# Patient Record
Sex: MALE | Race: WHITE | Employment: FULL TIME | URBAN - METROPOLITAN AREA
[De-identification: names, ages, dates, MRNs, and addresses within clinical notes are randomized per-mention and may not be internally consistent; named-entity substitution may affect disease eponyms.]

---

## 2023-09-11 ENCOUNTER — PATIENT OUTREACH (OUTPATIENT)
Dept: CARE COORDINATION | Facility: CLINIC | Age: 48
End: 2023-09-11
Payer: COMMERCIAL

## 2023-09-11 NOTE — PROGRESS NOTES
Outreach call to patient to support a smooth transition of care from recent admission.  Unable to leave a voicemail message for patient with my contact information.    JOHNNA ReneeN, RN.

## 2023-10-29 ENCOUNTER — HOSPITAL ENCOUNTER (EMERGENCY)
Facility: HOSPITAL | Age: 48
Discharge: HOME | End: 2023-10-29
Payer: COMMERCIAL

## 2023-10-29 VITALS
HEIGHT: 67 IN | WEIGHT: 125 LBS | RESPIRATION RATE: 18 BRPM | DIASTOLIC BLOOD PRESSURE: 92 MMHG | TEMPERATURE: 97.7 F | HEART RATE: 45 BPM | OXYGEN SATURATION: 100 % | SYSTOLIC BLOOD PRESSURE: 151 MMHG | BODY MASS INDEX: 19.62 KG/M2

## 2023-10-29 DIAGNOSIS — K08.89 PAIN, DENTAL: Primary | ICD-10-CM

## 2023-10-29 PROCEDURE — 99283 EMERGENCY DEPT VISIT LOW MDM: CPT

## 2023-10-29 RX ORDER — NAPROXEN 500 MG/1
500 TABLET ORAL
Qty: 30 TABLET | Refills: 0 | Status: SHIPPED | OUTPATIENT
Start: 2023-10-29 | End: 2023-11-13

## 2023-10-29 RX ORDER — CHLORHEXIDINE GLUCONATE ORAL RINSE 1.2 MG/ML
15 SOLUTION DENTAL AS NEEDED
Qty: 120 ML | Refills: 0 | Status: SHIPPED | OUTPATIENT
Start: 2023-10-29 | End: 2023-11-12

## 2023-10-29 RX ORDER — AMOXICILLIN AND CLAVULANATE POTASSIUM 875; 125 MG/1; MG/1
1 TABLET, FILM COATED ORAL EVERY 12 HOURS
Qty: 14 TABLET | Refills: 0 | Status: SHIPPED | OUTPATIENT
Start: 2023-10-29 | End: 2023-11-05

## 2023-10-29 ASSESSMENT — PAIN - FUNCTIONAL ASSESSMENT: PAIN_FUNCTIONAL_ASSESSMENT: 0-10

## 2023-10-29 ASSESSMENT — PAIN DESCRIPTION - ONSET: ONSET: SUDDEN

## 2023-10-29 ASSESSMENT — PAIN DESCRIPTION - LOCATION: LOCATION: TEETH

## 2023-10-29 ASSESSMENT — PAIN DESCRIPTION - PROGRESSION: CLINICAL_PROGRESSION: GRADUALLY WORSENING

## 2023-10-29 ASSESSMENT — COLUMBIA-SUICIDE SEVERITY RATING SCALE - C-SSRS
2. HAVE YOU ACTUALLY HAD ANY THOUGHTS OF KILLING YOURSELF?: NO
6. HAVE YOU EVER DONE ANYTHING, STARTED TO DO ANYTHING, OR PREPARED TO DO ANYTHING TO END YOUR LIFE?: NO
1. IN THE PAST MONTH, HAVE YOU WISHED YOU WERE DEAD OR WISHED YOU COULD GO TO SLEEP AND NOT WAKE UP?: NO

## 2023-10-29 ASSESSMENT — PAIN DESCRIPTION - PAIN TYPE: TYPE: ACUTE PAIN

## 2023-10-29 ASSESSMENT — PAIN SCALES - GENERAL: PAINLEVEL_OUTOF10: 10 - WORST POSSIBLE PAIN

## 2023-10-29 ASSESSMENT — PAIN DESCRIPTION - FREQUENCY: FREQUENCY: CONSTANT/CONTINUOUS

## 2023-10-29 ASSESSMENT — PAIN DESCRIPTION - DESCRIPTORS: DESCRIPTORS: SHOOTING;ACHING

## 2023-10-29 ASSESSMENT — PAIN DESCRIPTION - ORIENTATION: ORIENTATION: LEFT;LOWER

## 2023-10-29 NOTE — ED PROVIDER NOTES
HPI   Chief Complaint   Patient presents with    Dental Pain     Pt BIB self for 10/10 dental pain, Pt has a cracked left rear molar that's been painful for several days. Pt complains of decreased appetite and pain to site and jaw.       Patient presents today complaining of dental pain.  Patient reports pain to his lower left jaw.  He reports that he did contact his dentist however he could not get into the office.  Denies fevers.  No reports of drooling or change in phonation.  He denies any facial swelling or asymmetry                          No data recorded                Patient History   No past medical history on file.  Past Surgical History:   Procedure Laterality Date    OTHER SURGICAL HISTORY  04/10/2019    Ear surgery     No family history on file.  Social History     Tobacco Use    Smoking status: Not on file    Smokeless tobacco: Not on file   Substance Use Topics    Alcohol use: Not on file    Drug use: Not on file       Physical Exam   ED Triage Vitals [10/29/23 0113]   Temp Heart Rate Resp BP   36.5 °C (97.7 °F) (!) 45 18 (!) 151/92      SpO2 Temp Source Heart Rate Source Patient Position   100 % Temporal Monitor Sitting      BP Location FiO2 (%)     Right arm --       Physical Exam  Vitals and nursing note reviewed.   Constitutional:       General: He is not in acute distress.     Appearance: Normal appearance. He is normal weight. He is not ill-appearing, toxic-appearing or diaphoretic.   HENT:      Head: Normocephalic.      Nose: Nose normal.      Mouth/Throat:      Mouth: Mucous membranes are moist.      Comments: Poor dentition throughout.  No evidence of Sheyla's angina.  No periapical abscess.  Significant decay throughout the left premolars  Cardiovascular:      Rate and Rhythm: Normal rate and regular rhythm.      Pulses: Normal pulses.   Pulmonary:      Effort: Pulmonary effort is normal. No respiratory distress.      Breath sounds: Normal breath sounds.   Abdominal:      General:  Abdomen is flat.      Palpations: Abdomen is soft.   Musculoskeletal:         General: Normal range of motion.      Cervical back: Normal range of motion and neck supple.   Skin:     General: Skin is warm and dry.      Capillary Refill: Capillary refill takes less than 2 seconds.   Neurological:      General: No focal deficit present.      Mental Status: He is alert and oriented to person, place, and time.   Psychiatric:         Mood and Affect: Mood normal.         Behavior: Behavior normal.         Thought Content: Thought content normal.         Judgment: Judgment normal.         ED Course & MDM   Diagnoses as of 10/29/23 0127   Pain, dental       Medical Decision Making  Patient treated with naproxen Peridex mouthwash and antibiotics.  Patient was instructed to follow-up with his dentist this Monday        Procedure  Procedures     Eze Menendez PA-C  10/29/23 0128

## 2024-02-21 ENCOUNTER — HOSPITAL ENCOUNTER (OUTPATIENT)
Dept: RADIOLOGY | Facility: HOSPITAL | Age: 49
Discharge: HOME | End: 2024-02-21
Payer: COMMERCIAL

## 2024-02-21 DIAGNOSIS — S39.012A STRAIN OF MUSCLE, FASCIA AND TENDON OF LOWER BACK, INITIAL ENCOUNTER: ICD-10-CM

## 2024-02-21 PROCEDURE — 72100 X-RAY EXAM L-S SPINE 2/3 VWS: CPT

## 2024-02-21 PROCEDURE — 72100 X-RAY EXAM L-S SPINE 2/3 VWS: CPT | Performed by: RADIOLOGY

## 2024-03-04 ENCOUNTER — EVALUATION (OUTPATIENT)
Dept: PHYSICAL THERAPY | Facility: CLINIC | Age: 49
End: 2024-03-04
Payer: COMMERCIAL

## 2024-03-04 DIAGNOSIS — S39.012A STRAIN OF MUSCLE, FASCIA AND TENDON OF LOWER BACK, INITIAL ENCOUNTER: ICD-10-CM

## 2024-03-04 PROCEDURE — 97110 THERAPEUTIC EXERCISES: CPT | Mod: GP

## 2024-03-04 PROCEDURE — 97161 PT EVAL LOW COMPLEX 20 MIN: CPT | Mod: GP

## 2024-03-04 ASSESSMENT — PATIENT HEALTH QUESTIONNAIRE - PHQ9
2. FEELING DOWN, DEPRESSED OR HOPELESS: NOT AT ALL
SUM OF ALL RESPONSES TO PHQ9 QUESTIONS 1 AND 2: 0
1. LITTLE INTEREST OR PLEASURE IN DOING THINGS: NOT AT ALL

## 2024-03-04 ASSESSMENT — COLUMBIA-SUICIDE SEVERITY RATING SCALE - C-SSRS
6. HAVE YOU EVER DONE ANYTHING, STARTED TO DO ANYTHING, OR PREPARED TO DO ANYTHING TO END YOUR LIFE?: NO
2. HAVE YOU ACTUALLY HAD ANY THOUGHTS OF KILLING YOURSELF?: NO
1. IN THE PAST MONTH, HAVE YOU WISHED YOU WERE DEAD OR WISHED YOU COULD GO TO SLEEP AND NOT WAKE UP?: NO

## 2024-03-04 NOTE — PROGRESS NOTES
Physical Therapy Evaluation    Patient Name Carlo Cho  MRN: [id number]  Today's Date: 03/04/24  Time Calculation  Start Time: 0900  Stop Time: 0950  Time Calculation (min): 50 min    Insurance: Payor: [employer] / Plan: [employer] / Product Type: *No Product type* /   -authorization required: yes  -number of visits authorized: 12  -Authorization/certification dates: 2/21/24-4/11/25  Next MD appointment: 3/11/24    Therapy Diagnoses:   1. Strain of muscle, fascia and tendon of lower back, initial encounter  PT eval and treat    Follow Up In Physical Therapy          Onset Date: DOI 2/20/24  Referring Provider: Ihsan MOODY-CNP  PT Orders: eval and treat 2x wk 6 wks    Assessment:    Impression/Clinical Presentation: lumbar strain  with flexion bias presenting with dec ROM/core strength/posture limiting function with ADL's and work    Skilled PT services will aid in advancing functional status and attaining therapy goals.    Problem List:  -activity limitations  -Functional limitations  -Pain B LS  -decreased  ROM  -decreased strength   -decreased flexibility  -posture awareness  -decreased knowledge of HEP      Goals:  STG 2 wks  Compliant with HEP  Dec pain 25%    LTG by discharge  I HEP  Improve functional outcome score to indicate improved functional mobility  Dec pain % on pain scale with improved sleep  Inc AROM lumbar WNL with improved LE dsg  Inc core strength 5/5 with ability to RTW full duty  Inc LE flexibility WNL  WNL gait I with proper body mechanics    Rehab Potential:  good    Clinical Presentation:    stable/and/or uncomplicated characteristics                                              Level of Complexity: low    Plan:    Therapeutic exercise, Manual therapy, Home program instruction and progression, Neuromuscular re-education, Self care and home management, Instruction in activity modification, Electrical  stimulation, and Cryotherapy    Nustep/TM for soft tissue warmup, posture instruction/exercises, lumbar ROM, LE flexibility, DLS, modalities prn, job simulation, body mechanics training    2 x week  until goals met or maximum rehab potential met  Pt is currently scheduled for 6 weeks    Plan of care was designed with input and agreement by the patient    Subjective:    Current Episode of Functional Impairment and/or Pain :  Chief complaint/HPI: bent over to  a tire at work with onset of B LS pain L>R  On meds for  a week with relief-steroid/meloxicam    Pain  Pain assessment 0-10  Pain score: 8  Pain location: B LS  Type: achy/sharp    Exacerbating Factors: transition of movement, standing  Relieving Factors: sitting, HP/CP    Current Medical management:     PMHx: Reviewed medical history form with patient and medical screening assessed.   Non-Hodgkins lymphoma 2019-chemotherapy-in remission now     Medications for pain: OTC     Diagnostic Tests: xrays - negative    Precautions: no fall risk/ deaf in R ear    Functional Limitations: Lifting, Dressing, Sleeping, Walking, Working, and Standing    Home Living Situation: lives with family  1 story home    Prior Level of Function worked full duty     Patient Stated Goal For Therapy rid of back pain    Occupation:   Currently on light duty with 10# lifting restriction  Full duty requires 50# or >    Objective:    Ortho:  Lumbar ROM  FB: 40%  BB: 15%  RSB: 25%  LSB: 40%    Flexibility   Hamstrings B 60  piriformis: B min tight     Strength  BLE 5/5  abdominals: 4-/5 pain   back extensors bridges 100% with mild instability    Special Tests:   SLR: B -    posture: RS/FH, dec lumbar lordosis, mild mid thoracic scoliosis    palpation: no POP    Gait: antalgic  No device  Slightly flexed posture  Reciprocal stairs    Repeated Force Analysis    standing flexion: inc pain  standing extension: inc pain  supine flexion: no change  repeated flexion: DKC  dec  pain  hooklying: dec pain  prone: no change  EMILE: inc pain  PPU: NT  repeated PPU: NT      Outcome Measures:  Other Measures  Oswestry Disablity Index (TYSON): 40     Treatment:    PT Evaluation Time Entry  PT Evaluation (Low) Time Entry: 25  minutes    Therapeutic Exercise:                             15 minutes            DKC 10x  SKC 10x  Supine hamstring stretch 10 sec 5x  LTR 10x   Pelvic tilt 10x  Isometric hip adduction 10x                                  Modalities:                                             Electrical Stimulation            minutes    Cold Pack     Lumbar hooklying                     10 minutes    Response to treatment: improved knowledge and understanding of condition  Pain level at 3/10 after exercise    Education: Educated on relevant anatomy and expected plan of care  Instructed in initial HEP including reasoning of given exercises and issued written instructions

## 2024-03-08 ENCOUNTER — TREATMENT (OUTPATIENT)
Dept: PHYSICAL THERAPY | Facility: CLINIC | Age: 49
End: 2024-03-08
Payer: COMMERCIAL

## 2024-03-08 DIAGNOSIS — S39.012A STRAIN OF MUSCLE, FASCIA AND TENDON OF LOWER BACK, INITIAL ENCOUNTER: Primary | ICD-10-CM

## 2024-03-08 PROCEDURE — 97014 ELECTRIC STIMULATION THERAPY: CPT | Mod: GP

## 2024-03-08 PROCEDURE — 97112 NEUROMUSCULAR REEDUCATION: CPT | Mod: GP

## 2024-03-08 PROCEDURE — 97110 THERAPEUTIC EXERCISES: CPT | Mod: GP

## 2024-03-08 NOTE — PROGRESS NOTES
Physical Therapy Treatment Note      Patient Name Carlo Cho  MRN: [id number]  Today's Date: 03/08/24  Time Calculation  Start Time: 0740  Stop Time: 0835  Time Calculation (min): 55 min    Insurance: Payor: [employer] / Plan: [employer] / Product Type: *No Product type* /   Visit #: 2  Date of Onset:  DOI 2/20/24   -authorization required: yes  -number of visits authorized 12  -authorization/ certification dates:2/21/24-4/11/25     General:  [facility] appt: 3/11/24 Ihsan Perdomo APRN-CNP     Therapy Diagnoses:   1. Strain of muscle, fascia and tendon of lower back, initial encounter  Follow Up In Physical Therapy          Assessment:  skilled intervention: exercise progression for lumbar stabilization, modalities for pain relief    Patient would continue to benefit from skilled PT to address remaining functional, objective and subjective deficits to allow them to return to full independence with ADLs     Initiated IFC for pain relief  Pulling into thoracic spine with swiss ball stretches limiting distance with stretch but able to perform    Plan:  Check response to IFC  Piriformis stretch    Precautions: no fall risk    Subjective:  General:  Arrived with slight dec pain but antalgic gait  Called [facility] for pain meds due to high pain levels later in day at work  Pain extending up into thoracic spine    Functional Progress:  Working light duty    Pain  Pain assessment 0-10  Pain score: 5  Pain location: BLS and some lower thoracic pain B today    Compliant with HEP:  yes    Understanding of HEP: WNL    Objective:  Therapeutic Exercise  30 minutes  see below  **indicates new exercises or progression  NP=not performed    Neuromuscular Re-education:  10 minutes  See below  **indicates new exercises or progression  NP=not performed    Modalities   Cold Pack                        15 minutes    Electric Stimulation  IFC/CP B LS  hooklying int 13   15 minutes    Other     Exercise Log:  Nustep L3 6' **  DKC 10x  SKC 10x  Supine hamstring stretch 10 sec 5x strap  LTR 10x   Pelvic tilt 10x  DLS SLR 10x **  Isometric hip adduction 10x  Hooklying hip abduction green 10x 2**  Swiss ball stretches F 10 sec 10x **    Education:  Instructed in progression of exercises and issued written instructions/green tband

## 2024-03-12 ENCOUNTER — TREATMENT (OUTPATIENT)
Dept: PHYSICAL THERAPY | Facility: CLINIC | Age: 49
End: 2024-03-12
Payer: COMMERCIAL

## 2024-03-12 DIAGNOSIS — S39.012A STRAIN OF MUSCLE, FASCIA AND TENDON OF LOWER BACK, INITIAL ENCOUNTER: ICD-10-CM

## 2024-03-12 PROCEDURE — 97112 NEUROMUSCULAR REEDUCATION: CPT | Mod: GP,CQ

## 2024-03-12 PROCEDURE — 97110 THERAPEUTIC EXERCISES: CPT | Mod: GP,CQ

## 2024-03-12 NOTE — PROGRESS NOTES
Physical Therapy Treatment Note      Patient Name Carlo Cho  MRN: [id number]  Today's Date: 03/12/24  Time Calculation  Start Time: 0745  Stop Time: 0840  Time Calculation (min): 55 min    Insurance: Payor: [employer] / Plan: [employer] / Product Type: *No Product type* /   Visit #: 3  Date of Onset:  DOI 2/20/24   -authorization required: yes  -number of visits authorized 12  -authorization/ certification dates:2/21/24-4/11/25     General:  Next MD appt: 3/20/24 Ihsan Perdomo APRN-CNP     Therapy Diagnoses:   1. Strain of muscle, fascia and tendon of lower back, initial encounter  Follow Up In Physical Therapy          Assessment:  skilled intervention: exercise progression for lumbar stabilization, modalities for pain relief    Patient would continue to benefit from skilled PT to address remaining functional, objective and subjective deficits to allow them to return to full independence with ADLs     Progressed with piriformis stretch, updated HEP  Continue IFC for pain relief      Plan:    Attempt standing stabilization    Precautions: no fall risk    Subjective:  General:  Arrived with slight dec pain but antalgic gait  Pain still in LB, sometimes it travels up. IFC helped a lot last session , felt better for most of that day.  Had follow up with [facility] yesterday: no change in meds, stay active , continue PT, use thermacare heat wraps, if no improvement by next appt may refer him to back specialist    Functional Progress:  Working light duty    Pain  Pain assessment 0-10  Pain score: 4  Pain location: BLS and some lower thoracic pain B today    Compliant with HEP:  yes    Understanding of HEP: WNL    Objective:  Therapeutic Exercise  30 minutes  see below  **indicates new exercises or progression  NP=not performed    Neuromuscular Re-education:  10 minutes  See below  **indicates new exercises or progression  NP=not  performed    Modalities   Cold Pack                         10 minutes    Electric Stimulation  IFC/CP B LS hooklying int 13   15 minutes    Other     Exercise Log:  Nustep L3 6'   DKC 10x  SKC 10x  Supine hamstring stretch 10 sec 5x strap  Piriformis stretch 10 sec 5x **  LTR 10x   Pelvic tilt 10x  DLS SLR 10x   Isometric hip adduction 10x  Hooklying hip abduction green 10x 2  Swiss ball stretches F 10 sec 10x     Education:  Instructed in progression of exercises  Access Code: [email or web address]  URL: [email or web address]  Date: 03/12/2024  Prepared by: Lacey    Exercises  - Supine Piriformis Stretch with Leg Straight  - 1 x daily - 7 x weekly - 1 sets - 5 reps - 10 hold

## 2024-03-14 ENCOUNTER — TREATMENT (OUTPATIENT)
Dept: PHYSICAL THERAPY | Facility: CLINIC | Age: 49
End: 2024-03-14
Payer: COMMERCIAL

## 2024-03-14 DIAGNOSIS — S39.012A STRAIN OF MUSCLE, FASCIA AND TENDON OF LOWER BACK, INITIAL ENCOUNTER: ICD-10-CM

## 2024-03-14 PROCEDURE — 97112 NEUROMUSCULAR REEDUCATION: CPT | Mod: GP

## 2024-03-14 PROCEDURE — 97110 THERAPEUTIC EXERCISES: CPT | Mod: GP

## 2024-03-14 PROCEDURE — 97014 ELECTRIC STIMULATION THERAPY: CPT | Mod: GP

## 2024-03-14 NOTE — PROGRESS NOTES
Physical Therapy Treatment Note      Patient Name Carlo Cho  MRN: [id number]  Today's Date: 03/14/24  Time Calculation  Start Time: 0830  Stop Time: 0925  Time Calculation (min): 55 min    Insurance: Payor: [employer] / Plan: [employer] / Product Type: *No Product type* /   Visit #: 4  Date of Onset: DOI 2/20/24  -authorization required: yes  -number of visits authorized 12  -authorization/ certification dates:2/21/24-4/11/25      General:  [facility] appt: 3/20/24 Ihsan Perdomo APRN-CNP     Therapy Diagnoses:   1. Strain of muscle, fascia and tendon of lower back, initial encounter  Follow Up In Physical Therapy          Assessment:  skilled intervention: exercise progression for lumbar stabilization, modalities for pain releif and soft tissue restrictions    Patient would continue to benefit from skilled PT to address remaining functional, objective and subjective deficits to allow them to return to full independence with ADLs     Arrived with dec pain and able to progress to standing stabilization today  Responding well to IFC for pain relief  Challenged with 2# with DLS SLR-needs to inc core strength for safe RTW full duty    Plan:  Knee to waist  rows    Precautions: no fall risk    Subjective:  General:  Arrived with dec pain    Functional Progress:  Mostly having pain with bending and trying to get up    Pain  Pain assessment 0-10  Pain score: 1-2  Pain location: BLS/RLE soreness in thigh    Compliant with HEP:  yes    Understanding of HEP: WNL    Objective:  Therapeutic Exercise  25 minutes  see below  **indicates new exercises or progression  NP=not performed    Neuromuscular Re-education:  15 minutes  See below  **indicates new exercises or progression  NP=not performed    Modalities   Cold Pack                        15 minutes    Electric Stimulation  IFC/CP B LS hooklying int 15   15 minutes    Other     Exercise  Log:  Nustep L3 6'   DKC 10x  SKC 10x  Supine hamstring stretch 10 sec 5x strap  Piriformis stretch 10 sec 5x   LTR 10x   Pelvic tilt 10x  DLS SLR 10x 2# **  Isometric hip adduction 10x  Hooklying hip abduction blue 10x 2 **  Swiss ball stretches F/l 10 sec 5x  **    DLS flex/ext blue 10x2**  DLS abdominal pull downs 3 way blue 10x **    Education:  Instructed in progression of exercises  Reviewed proper techniques with bending

## 2024-03-19 ENCOUNTER — TREATMENT (OUTPATIENT)
Dept: PHYSICAL THERAPY | Facility: CLINIC | Age: 49
End: 2024-03-19
Payer: COMMERCIAL

## 2024-03-19 DIAGNOSIS — S39.012A STRAIN OF MUSCLE, FASCIA AND TENDON OF LOWER BACK, INITIAL ENCOUNTER: Primary | ICD-10-CM

## 2024-03-19 PROCEDURE — 97112 NEUROMUSCULAR REEDUCATION: CPT | Mod: GP

## 2024-03-19 PROCEDURE — 97014 ELECTRIC STIMULATION THERAPY: CPT | Mod: GP

## 2024-03-19 PROCEDURE — 97110 THERAPEUTIC EXERCISES: CPT | Mod: GP

## 2024-03-19 NOTE — PROGRESS NOTES
Physical Therapy Treatment Note      Patient Name Carlo Cho  MRN: [id number]  Today's Date: 03/19/24  Time Calculation  Start Time: 0915  Stop Time: 1010  Time Calculation (min): 55 min    Insurance: Payor: [employer] / Plan: [employer] / Product Type: *No Product type* /   Visit #: 5  Date of Onset: DOI 2/20/24   -authorization required: yes  -number of visits authorized 12  -authorization/ certification dates: 2/21/24-4/11/25      General:  Next MD appt: 3/20/24 Ihsan Perdomo APRN-CNP     Therapy Diagnoses:   1. Strain of muscle, fascia and tendon of lower back, initial encounter  Follow Up In Physical Therapy          Assessment:  skilled intervention: exercise progression for function    Patient would continue to benefit from skilled PT to address remaining functional, objective and subjective deficits to allow them to return to full independence with ADLs     Progressed with function with initiation of lifting- Challenged with knee to waist lift    Plan:  Progress lifting to tolerance    Precautions: no fall risk    Subjective:  General:  Pain can get as high as 7 later in day  Woke up with R knee pain  yesterday but better today  Cont to get relief from IFC    Functional Progress:  Working light duty, painful later in day    Pain  Pain assessment 0-10  Pain score: 0  Pain location: B LS    Compliant with HEP:  yes    Understanding of HEP: WNL    Objective:  Therapeutic Exercise  25 minutes  see below  **indicates new exercises or progression  NP=not performed    Neuromuscular Re-education:  15 minutes  See below  **indicates new exercises or progression  NP=not performed    Manual Therapy:      minutes      Modalities   Cold Pack                        15 minutes    Electric Stimulation  IFC/CP B LS hooklying int 13   15 minutes    Other     Exercise Log:  Nustep L3 6'   DKC 10x  SKC 10x  Supine hamstring stretch 10  sec 5x strap  Piriformis stretch 10 sec 5x   LTR 10x   Pelvic tilt 10x  DLS SLR 10x2 2#   Isometric hip adduction 10x  Hooklying hip abduction blue 10x 2   Swiss ball stretches F/l 10 sec 5x       DLS flex/ext blue 10x2  DLS abdominal pull downs 3 way blue 10x     Knee to waist 5x2  20# **    Education:  Instructed in progression of exercises  Educated in proper body mechanics with lifting  Re-instructed piriformis stretch

## 2024-03-22 ENCOUNTER — TREATMENT (OUTPATIENT)
Dept: PHYSICAL THERAPY | Facility: CLINIC | Age: 49
End: 2024-03-22
Payer: COMMERCIAL

## 2024-03-22 DIAGNOSIS — S39.012A STRAIN OF MUSCLE, FASCIA AND TENDON OF LOWER BACK, INITIAL ENCOUNTER: ICD-10-CM

## 2024-03-22 PROCEDURE — 97112 NEUROMUSCULAR REEDUCATION: CPT | Mod: GP

## 2024-03-22 PROCEDURE — 97110 THERAPEUTIC EXERCISES: CPT | Mod: GP

## 2024-03-22 PROCEDURE — 97014 ELECTRIC STIMULATION THERAPY: CPT | Mod: GP

## 2024-03-22 NOTE — PROGRESS NOTES
Physical Therapy Treatment Note      Patient Name Carlo Cho  MRN: [id number]  Today's Date: 03/22/24  Time Calculation  Start Time: 0745  Stop Time: 0840  Time Calculation (min): 55 min    Insurance: Payor: [employer] / Plan: [employer] / Product Type: *No Product type* /   Visit #: 6  Date of Onset: DOI 2/20/24   -authorization required: yes  -number of visits authorized  -authorization/ certification dates:  2/21/24-4/11/25      General:  Next MD appt: 4/4/24    Therapy Diagnoses:   1. Strain of muscle, fascia and tendon of lower back, initial encounter  Follow Up In Physical Therapy          Assessment:  skilled intervention: exercise progression for lumbar stabilization     Patient would continue to benefit from skilled PT to address remaining functional, objective and subjective deficits to allow them to return to full independence with ADLs     Plan:  Cont to progress lifting to tolerance  Inc tband    Precautions: no fall risk    Subjective:  General:  Arrived with no pain  Still getting relief from IFC    Functional Progress:  Saw [facility] and lifting inc to 40#   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  Sleeping well  LE dsg improving    Pain  Pain assessment 0-10  Pain score: 0  Pain location: B LS    Compliant with HEP:  yes    Understanding of HEP: WNL    Objective:  Therapeutic Exercise  25 minutes  see below  **indicates new exercises or progression  NP=not performed    Neuromuscular Re-education:  15 minutes  See below  **indicates new exercises or progression  NP=not performed    Modalities   Cold Pack                        15 minutes    Electric Stimulation  IFC/CP B LS hooklying int 17    15 minutes    Other     Exercise Log:  Nustep L3 6'   DKC 10x  SKC 10x  Supine hamstring stretch 10 sec 5x strap  Piriformis stretch 10 sec 5x   LTR 10x   Pelvic tilt 10x  DLS SLR 10x2 2#   Isometric hip  adduction 10x  Hooklying hip abduction blue 10x 2   Swiss ball stretches F/l 10 sec 5x       DLS flex/ext blue 10x2  DLS abdominal pull downs 3 way blue 10x   Paloff press blue 10x2 **     Knee to waist 5x2  25# **    Education:  Instructed in progression of exercises  Reviewed proper body mechanics with lifting

## 2024-03-26 ENCOUNTER — TREATMENT (OUTPATIENT)
Dept: PHYSICAL THERAPY | Facility: CLINIC | Age: 49
End: 2024-03-26
Payer: COMMERCIAL

## 2024-03-26 DIAGNOSIS — S39.012A STRAIN OF MUSCLE, FASCIA AND TENDON OF LOWER BACK, INITIAL ENCOUNTER: ICD-10-CM

## 2024-03-26 PROCEDURE — 97110 THERAPEUTIC EXERCISES: CPT | Mod: GP,CQ

## 2024-03-26 PROCEDURE — 97112 NEUROMUSCULAR REEDUCATION: CPT | Mod: GP,CQ

## 2024-03-26 PROCEDURE — 97014 ELECTRIC STIMULATION THERAPY: CPT | Mod: GP,CQ

## 2024-03-26 NOTE — PROGRESS NOTES
Physical Therapy Treatment Note      Patient Name Carlo Cho  MRN: [id number]  Today's Date: 03/26/24  Time Calculation  Start Time: 0745  Stop Time: 0840  Time Calculation (min): 55 min    Insurance: Payor: [employer] / Plan: [employer] / Product Type: *No Product type* /   Visit #: 7  Date of Onset: DOI 2/20/24   -authorization required: yes  -number of visits authorized 12  -authorization/ certification dates:  2/21/24-4/11/25      General:  Next MD appt: 4/4/24    Therapy Diagnoses:   1. Strain of muscle, fascia and tendon of lower back, initial encounter  Follow Up In Physical Therapy          Assessment:  skilled intervention: exercise progression for lumbar stabilization     Patient would continue to benefit from skilled PT to address remaining functional, objective and subjective deficits to allow them to return to full independence with ADLs       Increase pain upon arrival due to inc work activity. Pain did decrease to a 2  with exercise program though no increases in lifting today. Did need some review of correct body mechanics.  [facility]ria estim well.    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 Did sleep well last night.  Still getting relief from IFC    Functional Progress:  Changing tires at work  Sleeping well.    Pain  Pain assessment 0-10  Pain score: 4  Pain location: B LS    Compliant with HEP:  yes    Understanding of HEP: WNL    Objective:  Therapeutic Exercise  25 minutes  see below  **indicates new exercises or progression  NP=not performed    Neuromuscular Re-education:  15 minutes  See below  **indicates new exercises or progression  NP=not performed    Modalities   Cold Pack                           minutes    Electric Stimulation  IFC/CP B LS hooklying int 17    15 minutes    Other     Exercise Log:  Nustep L3 6'   DKC 10x  SKC 10x  Supine hamstring stretch 10 sec 5x strap  Piriformis stretch 10 sec 5x   LTR 10x   Pelvic tilt 10x  DLS SLR 10x2 2#   Isometric hip adduction 10x  Hooklying hip abduction blue 10x 2   Swiss ball stretches F/l 10 sec 5x       DLS flex/ext blue 10x2  DLS abdominal pull downs 3 way blue 10x   Paloff press blue 10x2 **     Knee to waist 5x  25#     Education:  Instructed in progression of exercises  Reviewed proper body mechanics with lifting

## 2024-03-29 ENCOUNTER — TREATMENT (OUTPATIENT)
Dept: PHYSICAL THERAPY | Facility: CLINIC | Age: 49
End: 2024-03-29
Payer: COMMERCIAL

## 2024-03-29 DIAGNOSIS — S39.012A STRAIN OF MUSCLE, FASCIA AND TENDON OF LOWER BACK, INITIAL ENCOUNTER: ICD-10-CM

## 2024-03-29 PROCEDURE — 97014 ELECTRIC STIMULATION THERAPY: CPT | Mod: GP

## 2024-03-29 PROCEDURE — 97112 NEUROMUSCULAR REEDUCATION: CPT | Mod: GP

## 2024-03-29 PROCEDURE — 97110 THERAPEUTIC EXERCISES: CPT | Mod: GP

## 2024-03-29 NOTE — PROGRESS NOTES
Physical Therapy Treatment Note      Patient Name Carlo Cho  MRN: [id number]  Today's Date: 03/29/24  Time Calculation  Start Time: 0745  Stop Time: 0840  Time Calculation (min): 55 min    Insurance: Payor: [employer] / Plan: [employer] / Product Type: *No Product type* /   Visit #: 8  Date of Onset: DOI 2/20/24   -authorization required: yes  -number of visits authorized 12  -authorization/ certification dates:  2/21/24-4/11/25      General:  Next MD appt: 4/4/24     Therapy Diagnoses:   Problem List Items Addressed This Visit             ICD-10-CM    Strain of muscle, fascia and tendon of lower back, initial encounter S39.012A       Assessment:  skilled intervention: exercise progression for strength    Patient would continue to benefit from skilled PT to address remaining functional, objective and subjective deficits to allow them to return to full independence with ADLs     Progressed with inc lifting and inc resistance with standing stabilization  Arrived pain free and remained pain free t/o session today    Plan:  Inc lifting to 30#    Precautions: no fall risk    Subjective:  General:  Arrived with no pain today    Functional Progress:  Tolerating limited lifting at work-restriction was lowered to 25#  Pain level at end of work day 5    Pain  Pain assessment 0-10  Pain score: 0  Pain location: B LS    Compliant with HEP:  yes    Understanding of HEP: WNL    Objective:  Therapeutic Exercise  25 minutes  see below  **indicates new exercises or progression  NP=not performed    Neuromuscular Re-education:  15 minutes  See below  **indicates new exercises or progression  NP=not performed    Modalities   Cold Pack                        15 minutes    Electric Stimulation  IFC/CP B LS hooklying int 28    15 minutes    Other     Exercise Log:  Nustep L3 6'   DKC 10x  SKC 10x  Supine hamstring stretch 10 sec 5x  strap  Piriformis stretch 10 sec 5x   LTR 10x   Pelvic tilt 10x  DLS SLR 10x2 2#   Isometric hip adduction 10x  Hooklying hip abduction blue 10x 2   Swiss ball stretches F/l 10 sec 5x       DLS flex/ext black 10x2 **  DLS abdominal pull downs 3 way black 10x **  Paloff press black 10x2  **  H/V ball push outs 6# 5x **     Knee to waist 5x  27.5# **    Education:  Instructed in progression of exercises  Issued black tband

## 2024-04-02 ENCOUNTER — DOCUMENTATION (OUTPATIENT)
Dept: PHYSICAL THERAPY | Facility: CLINIC | Age: 49
End: 2024-04-02
Payer: COMMERCIAL

## 2024-04-02 ENCOUNTER — APPOINTMENT (OUTPATIENT)
Dept: PHYSICAL THERAPY | Facility: CLINIC | Age: 49
End: 2024-04-02
Payer: COMMERCIAL

## 2024-04-02 NOTE — PROGRESS NOTES
Physical Therapy                 Therapy Communication Note    Patient Name: Carlo Cho  MRN: [id number]  Today's Date: 4/2/2024     Discipline: Physical Therapy    Missed Visit Reason:  sick    Missed Time: Cancel    Comment:

## 2024-04-04 ENCOUNTER — HOSPITAL ENCOUNTER (OUTPATIENT)
Dept: RADIOLOGY | Facility: HOSPITAL | Age: 49
Discharge: HOME | End: 2024-04-04
Payer: COMMERCIAL

## 2024-04-04 DIAGNOSIS — S39.012A STRAIN OF MUSCLE, FASCIA AND TENDON OF LOWER BACK, INITIAL ENCOUNTER: ICD-10-CM

## 2024-04-04 PROCEDURE — 72100 X-RAY EXAM L-S SPINE 2/3 VWS: CPT | Performed by: RADIOLOGY

## 2024-04-04 PROCEDURE — 72100 X-RAY EXAM L-S SPINE 2/3 VWS: CPT

## 2024-04-05 ENCOUNTER — TREATMENT (OUTPATIENT)
Dept: PHYSICAL THERAPY | Facility: CLINIC | Age: 49
End: 2024-04-05
Payer: COMMERCIAL

## 2024-04-05 DIAGNOSIS — S39.012A STRAIN OF MUSCLE, FASCIA AND TENDON OF LOWER BACK, INITIAL ENCOUNTER: Primary | ICD-10-CM

## 2024-04-05 PROCEDURE — 97110 THERAPEUTIC EXERCISES: CPT | Mod: GP

## 2024-04-05 PROCEDURE — 97014 ELECTRIC STIMULATION THERAPY: CPT | Mod: GP

## 2024-04-05 PROCEDURE — 97112 NEUROMUSCULAR REEDUCATION: CPT | Mod: GP

## 2024-04-05 NOTE — PROGRESS NOTES
Physical Therapy Treatment Note      Patient Name Carlo Cho  MRN: [id number]  Today's Date: 04/05/24  Time Calculation  Start Time: 0745  Stop Time: 0840  Time Calculation (min): 55 min    Insurance: Payor: [employer] / Plan: [employer] / Product Type: *No Product type* /   Visit #: 9  Date of Onset: DOI 2/20/24   -authorization required: yes  -number of visits authorized 12  -authorization/ certification dates: 2/21/24-4/11/24  end date extension to 4/26/24    General:  Next MD appt: 4/16/24    Problem List Items Addressed This Visit             ICD-10-CM    Strain of muscle, fascia and tendon of lower back, initial encounter - Primary S39.012A       Assessment:  skilled intervention: exercise progression for function, modalities for pain, reasmt of repeated force analysis due to change in sx and exacerbating/relieving factors    Patient would continue to benefit from skilled PT to address remaining functional, objective and subjective deficits to allow them to return to full independence with ADLs     Held lifting/resistive exercises today due to inc pain  Changed warmup to TM due to more comfortable standing today than sitting  Pt with change in sx since last visit with onset of LLE radiculopathy and pain now exacerbating with sitting and relieved by standing which is opposite of what it was at [facility]  Pt reported no change in pain level t/o session with any position or activity- did report some LLE pain with PPU but otherwise unchanged with prone positioning    Plan:  Resume full program  Attempt 30# lift box  Called for date extension and further C9 approval    Precautions: no fall risk/deaf in R ear    Subjective:  General:  Woke up with a migraine Tuesday morning and LBP has also been worse  Saw [facility] yesterday-advised to see spine specialist  Had some posterior LLE pain last night laying in bed for first time  since the injury    Functional Progress:  Working light duty with 20# lifting restriction  Amb with antalgic gait with wide RUBEN and in slight flexion    Pain  Pain assessment 0-10  Pain score: 8  Pain location: B LS    Compliant with HEP:  yes    Understanding of HEP: WNL    Objective:  Therapeutic Exercise  30 minutes  see below  **indicates new exercises or progression  NP=not performed    Neuromuscular Re-education:  10 minutes  See below  **indicates new exercises or progression  NP=not performed      Modalities   Cold Pack                        15 minutes    Electric Stimulation  IFC/CP B LS hooklying int 13   15 minutes    Other   Repeated Force Analysis  Supine flexion No change  Repeated supine flexion 10x no change  hooklying: no change  prone: no change  EMILE: no change  PPU: no change  repeated PPU: 2x posterior LLE pain  Prone over pillow no change in LBP but no LLE pain    Exercise Log:  Nustep L3 6' NP  TM 5' 1.0 MPH **  DKC 10x  SKC 10x  Supine hamstring stretch 10 sec 5x strap  Piriformis stretch 10 sec 5x   LTR 10x   Pelvic tilt 10x  DLS SLR 10x2 2# NP  Isometric hip adduction 10x  Hooklying hip abduction blue 10x 2   Swiss ball stretches F/L 10 sec 5x       NP below 4/5/24  DLS flex/ext black 10x2   DLS abdominal pull downs 3 way black 10x   Paloff press black 10x2    H/V ball push outs 6# 5x      Knee to waist 5x  30# NP    Education:  Instructed in progression of exercises

## 2024-04-06 ENCOUNTER — HOSPITAL ENCOUNTER (EMERGENCY)
Facility: HOSPITAL | Age: 49
Discharge: HOME | End: 2024-04-06
Payer: COMMERCIAL

## 2024-04-06 VITALS
BODY MASS INDEX: 19.62 KG/M2 | SYSTOLIC BLOOD PRESSURE: 135 MMHG | DIASTOLIC BLOOD PRESSURE: 78 MMHG | RESPIRATION RATE: 16 BRPM | TEMPERATURE: 98.2 F | HEIGHT: 67 IN | WEIGHT: 125 LBS | HEART RATE: 95 BPM | OXYGEN SATURATION: 99 %

## 2024-04-06 DIAGNOSIS — M54.50 ACUTE EXACERBATION OF CHRONIC LOW BACK PAIN: Primary | ICD-10-CM

## 2024-04-06 DIAGNOSIS — G89.29 ACUTE EXACERBATION OF CHRONIC LOW BACK PAIN: Primary | ICD-10-CM

## 2024-04-06 PROCEDURE — 96372 THER/PROPH/DIAG INJ SC/IM: CPT | Performed by: NURSE PRACTITIONER

## 2024-04-06 PROCEDURE — 2500000004 HC RX 250 GENERAL PHARMACY W/ HCPCS (ALT 636 FOR OP/ED): Performed by: NURSE PRACTITIONER

## 2024-04-06 PROCEDURE — 99283 EMERGENCY DEPT VISIT LOW MDM: CPT

## 2024-04-06 RX ORDER — ACETAMINOPHEN 325 MG/1
650 TABLET ORAL EVERY 6 HOURS PRN
Qty: 30 TABLET | Refills: 0 | Status: SHIPPED | OUTPATIENT
Start: 2024-04-06

## 2024-04-06 RX ORDER — METHOCARBAMOL 500 MG/1
500 TABLET, FILM COATED ORAL 2 TIMES DAILY
Qty: 20 TABLET | Refills: 0 | Status: SHIPPED | OUTPATIENT
Start: 2024-04-06 | End: 2024-04-16

## 2024-04-06 RX ORDER — KETOROLAC TROMETHAMINE 30 MG/ML
15 INJECTION, SOLUTION INTRAMUSCULAR; INTRAVENOUS ONCE
Status: COMPLETED | OUTPATIENT
Start: 2024-04-06 | End: 2024-04-06

## 2024-04-06 RX ORDER — LIDOCAINE 50 MG/G
1 PATCH TOPICAL DAILY
Qty: 7 PATCH | Refills: 0 | Status: SHIPPED | OUTPATIENT
Start: 2024-04-06

## 2024-04-06 RX ADMIN — KETOROLAC TROMETHAMINE 15 MG: 30 INJECTION, SOLUTION INTRAMUSCULAR at 12:50

## 2024-04-06 ASSESSMENT — PAIN SCALES - GENERAL
PAINLEVEL_OUTOF10: 9
PAINLEVEL_OUTOF10: 10 - WORST POSSIBLE PAIN

## 2024-04-06 ASSESSMENT — PAIN DESCRIPTION - LOCATION: LOCATION: BACK

## 2024-04-06 ASSESSMENT — PAIN - FUNCTIONAL ASSESSMENT: PAIN_FUNCTIONAL_ASSESSMENT: 0-10

## 2024-04-06 ASSESSMENT — PAIN DESCRIPTION - PAIN TYPE: TYPE: CHRONIC PAIN

## 2024-04-06 NOTE — Clinical Note
Carlo Cho was seen and treated in our emergency department on 4/6/2024.  He may return to work on 04/09/2024.       If you have any questions or concerns, please don't hesitate to call.      Aurelia Mead, HEIDY-CNP

## 2024-04-06 NOTE — ED PROVIDER NOTES
HPI   Chief Complaint   Patient presents with    Back Pain         History provided by:  Patient   used: No      48-year-old male presents for evaluation of acute on chronic lower back pain.  Patient states that over the last week his chronic back pain that has been having for years has not gotten better despite his normal regimen of ibuprofen and warm compresses.  He states that he does follow-up with physical therapy with last session yesterday.  Additionally his primary care provider set him up with a spinal doctor which she sees in the next week.  He denies any new trauma, falls, injury, anticoagulation use.  Denies any history of IV drug abuse.  Denies any fevers, chills, numbness, tingling, weakness, inability ambulate, incontinence of bowel or bladder or any additional symptoms or complaints at this time.  Took no additional medications at home for symptoms.    ROS is otherwise negative unless stated above.                  Estephania Coma Scale Score: 15                     Patient History   No past medical history on file.  Past Surgical History:   Procedure Laterality Date    OTHER SURGICAL HISTORY  04/10/2019    Ear surgery     No family history on file.  Social History     Tobacco Use    Smoking status: Not on file    Smokeless tobacco: Not on file   Substance Use Topics    Alcohol use: Not on file    Drug use: Not on file       Physical Exam   ED Triage Vitals [04/06/24 1234]   Temperature Heart Rate Respirations BP   36.8 °C (98.2 °F) 95 16 135/78      Pulse Ox Temp src Heart Rate Source Patient Position   99 % -- -- --      BP Location FiO2 (%)     -- --       Physical Exam    VS: As documented in the triage note and EMR flowsheet from this visit were reviewed.    GEN: NAD, nontoxic, well-appearing, ambulates without difficulty  CARD: RRR, nontender chest, no crepitus deformities, no JVD, no murmurs rubs or gallops ; No edema noted.  Positive pulses bilaterally throughout.  Capillary  refill less than 3 seconds.  No abnormal redness, warmth, tenderness or swelling noted to bilateral lower extremities.  MUSK: Spine appears normal, range of motion is not limited, positive tenderness to palpation of the entire lumbar region.  Strength 5 out of 5 equal bilaterally throughout.  No step-offs, deformities or additional signs of trauma noted.  No spinal/midline tenderness to palpation  SKIN: Skin normal color for race, warm, dry and intact. No evidence of trauma. No rash noted.  NEURO: Alert and oriented x 3, speech is clear, no obvious deficits noted. No facial droop noted.  Speech clear.  Negative Kernig's and Brudzinski sign.    ED Course & MDM   Diagnoses as of 04/06/24 1246   Acute exacerbation of chronic low back pain       Medical Decision Making       upon assessment patient was a healthy non-toxic appearing male in no apparent distress. he was ambulate independently in the emergency department.  He was neurovascularly intact.  He had full range of motion. No step offs, deformities or additional signs of trauma noted.  Assessment benign. No red flag signs for back pain noted. See physical exam section for my assessment.  Physical exam concerning for but not limited to musculoskeletal pain/acute on chronic lower back pain.  Due to prior history and current physical exam, I deemed no basic laboratory or radiologic studies were necessary at this time.  I was not concerned for any acute traumatic injury, spinal cord injury or additional emergent etiology of the cause of the symptoms today due to his hemodynamic stability and assessment.  Patient was offered  Toradol IM and Norflex IM (declined Norflex as he is driving) with relief of his symptoms. Patient remained hemodynamically stable throughout ED visit.  Previous consult/ED visit notes were reviewed. findings were discussed with patient and patient agreeable for plan to discharge home with primary care provider follow-up as needed for further  management, physical therapy as scheduled and spine doctor as scheduled.  Prior medications reviewed and Patient prescribed methocarbamol by mouth, Tylenol by mouth and lidocaine patches at home as needed for pain.  Educated that he could also use warm compresses. Return precautions discussed and patient acknowledged understanding.  All questions and concerns answered prior to discharge.           *Please note that portions of this note may have been completed with a voice recognition program.  Efforts were made to edit the dictations but occasionally, words are mis-transcribed.      Procedure  Procedures     HEIDY Bone-CNP  04/06/24 6176

## 2024-04-09 ENCOUNTER — TREATMENT (OUTPATIENT)
Dept: PHYSICAL THERAPY | Facility: CLINIC | Age: 49
End: 2024-04-09
Payer: COMMERCIAL

## 2024-04-09 DIAGNOSIS — S39.012A STRAIN OF MUSCLE, FASCIA AND TENDON OF LOWER BACK, INITIAL ENCOUNTER: ICD-10-CM

## 2024-04-09 PROCEDURE — 97014 ELECTRIC STIMULATION THERAPY: CPT | Mod: GP,CQ

## 2024-04-09 PROCEDURE — 97110 THERAPEUTIC EXERCISES: CPT | Mod: GP,CQ

## 2024-04-09 NOTE — PROGRESS NOTES
Physical Therapy Treatment Note      Patient Name Carlo Cho  MRN: [id number]  Today's Date: 04/09/24  Time Calculation  Start Time: 0745  Stop Time: 0830  Time Calculation (min): 45 min    Insurance: Payor: [employer] / Plan: [employer] / Product Type: *No Product type* /   Visit #: 10  Date of Onset: DOI 2/20/24   -authorization required: yes  -number of visits authorized 12  -authorization/ certification dates: 2/21/24-4/11/24  end date extension to 4/26/24    General:  Next MD appt: 4/16/24    Problem List Items Addressed This Visit             ICD-10-CM    Strain of muscle, fascia and tendon of lower back, initial encounter S39.012A       Assessment:  skilled intervention: exercise progression for function, modalities for pain    Patient would continue to benefit from skilled PT to address remaining functional, objective and subjective deficits to allow them to return to full independence with ADLs     Held lifting/resistive exercises today due to inc pain  Continued report of high pain level with ER visit over weekend. Awaiting authorization for spine appt. Performed exercises as per pt. tolerance today.    Pt reported no change in pain level t/o session with any position or activity    Plan:  Continue exercises and modalities per pt. Tolerance , awaiting authorization for spine MD appt    Precautions: no fall risk/deaf in R ear    Subjective:  General:  Amb to dept with slow ,antalgic gait  Went to the ER Saturday, could walk or bend . Gave him muscle relaxors and Tylenol. Yesterday was his scheduled day off . Not feeling any better since then . Waiting for the authorization put in by [facility] to see a spine specialist as the pain is now running down the R leg to the lateral thigh., no longer going down the L leg. Scheduled to RTW light duty later today.     Functional Progress:  Working light duty with 20# lifting  restriction  Amb with antalgic gait with wide RUBEN and in slight flexion    Pain  Pain assessment 0-10  Pain score: 8  Pain location: B LS    Compliant with HEP:  yes as able     Understanding of HEP: WNL    Objective:  Therapeutic Exercise  25 minutes  see below  **indicates new exercises or progression  NP=not performed    Neuromuscular Re-education:  5 minutes  See below  **indicates new exercises or progression  NP=not performed      Modalities   Cold Pack                          minutes    Electric Stimulation  IFC/CP B LS hooklying int 13   15 minutes    Other       Exercise Log:  Nustep L3 8'**  TM 5' 1.0 MPH  NP  DKC 10x declined  SKC 10x  Supine hamstring stretch 10 sec 5x strap  Piriformis stretch 10 sec 5x declined   LTR 10x   Pelvic tilt 10x  DLS SLR 10x2 2# NP  Isometric hip adduction 10x  Hooklying hip abduction blue 10x 2   Swiss ball stretches F/L 10 sec 5x       NP below 4/5/24  DLS flex/ext black 10x2   DLS abdominal pull downs 3 way black 10x   Paloff press black 10x2    H/V ball push outs 6# 5x      Knee to waist 5x  30# NP    Education:  Instructed in modification of exercises

## 2024-04-12 ENCOUNTER — TREATMENT (OUTPATIENT)
Dept: PHYSICAL THERAPY | Facility: CLINIC | Age: 49
End: 2024-04-12
Payer: COMMERCIAL

## 2024-04-12 DIAGNOSIS — S39.012A STRAIN OF MUSCLE, FASCIA AND TENDON OF LOWER BACK, INITIAL ENCOUNTER: ICD-10-CM

## 2024-04-12 PROCEDURE — 97014 ELECTRIC STIMULATION THERAPY: CPT | Mod: GP

## 2024-04-12 PROCEDURE — 97110 THERAPEUTIC EXERCISES: CPT | Mod: GP

## 2024-04-12 PROCEDURE — 97112 NEUROMUSCULAR REEDUCATION: CPT | Mod: GP

## 2024-04-12 NOTE — PROGRESS NOTES
Physical Therapy Treatment Note      Patient Name Carlo Cho  MRN: [id number]  Today's Date: 04/12/24  Time Calculation  Start Time: 0735  Stop Time: 0830  Time Calculation (min): 55 min    Insurance: Payor: [employer] / Plan: [employer] / Product Type: *No Product type* /   Visit #: 11  Date of Onset: DOI 2/20/24   -authorization required: yes  -number of visits authorized 12  -authorization/ certification dates: 2/21/24-4/11/24  end date extension to 4/26/24     General:  Next MD appt: 4/22/24     Problem List Items Addressed This Visit             ICD-10-CM    Strain of muscle, fascia and tendon of lower back, initial encounter S39.012A       Assessment:  skilled intervention: exercise progression for resuming full program    Patient would continue to benefit from skilled PT to address remaining functional, objective and subjective deficits to allow them to return to full independence with ADLs     Pt arrived amb in flexed position with wide RUBEN and B knee flex c/o LBP and R thigh burning which is a change from when I previously saw pt when he was having LLE pain which has resolved  Attempted standing extension for postural correction however reported inc LBP-  Resumed full program-by mid session pt able to stand with erect posture with normal amb    Plan:  1 more visit then put on hold until sees spine specialist    Precautions: no fall risk    Subjective:  General:  Saw [facility] this week  Waiting on approval from spine specialist  Had a better day yesterday but woke up with a little more pain today  Pain extends up into thoracic spine at times     Functional Progress:  Working light duty with no lifting  Tossing and turning a lot at night    Pain  Pain assessment 0-10  Pain score: 8  Pain location: central LBP, burning R thigh    Compliant with HEP:  yes    Understanding of HEP: WNL    Objective:  Therapeutic  Exercise  25 minutes  see below  **indicates new exercises or progression  NP=not performed    Neuromuscular Re-education:  15 minutes  See below  **indicates new exercises or progression  NP=not performed    Modalities   Cold Pack                        15 minutes    Electric Stimulation  IFC/CP B LS hooklying int 16    15 minutes    Other     Exercise Log:  Nustep L3 8'    DKC 5x   SKC 5x  Supine hamstring stretch 10 sec 5x strap  Piriformis stretch 10 sec 5x   LTR 5x   Pelvic tilt 10x  DLS SLR 10x   Isometric hip adduction 10x  Hooklying hip abduction blue 10x 2   Swiss ball stretches F/L 10 sec 5x       DLS flex/ext black 10x2   DLS abdominal pull downs 3 way black 10x   Paloff press black 10x2    H/V ball push outs 4# 10x 2     Knee to waist 5x  30# NP    Education:  Instructed in progression of exercises  Encouraged pt to make small movements into flexion and extension t/o day

## 2024-04-16 ENCOUNTER — TREATMENT (OUTPATIENT)
Dept: PHYSICAL THERAPY | Facility: CLINIC | Age: 49
End: 2024-04-16
Payer: COMMERCIAL

## 2024-04-16 DIAGNOSIS — S39.012A STRAIN OF MUSCLE, FASCIA AND TENDON OF LOWER BACK, INITIAL ENCOUNTER: ICD-10-CM

## 2024-04-16 PROCEDURE — 97014 ELECTRIC STIMULATION THERAPY: CPT | Mod: GP

## 2024-04-16 PROCEDURE — 97112 NEUROMUSCULAR REEDUCATION: CPT | Mod: GP

## 2024-04-16 PROCEDURE — 97110 THERAPEUTIC EXERCISES: CPT | Mod: GP

## 2024-04-16 NOTE — PROGRESS NOTES
Physical Therapy Treatment Note      Patient Name Carlo Cho  MRN: [id number]  Today's Date: 04/16/24  Time Calculation  Start Time: 0740  Stop Time: 0835  Time Calculation (min): 55 min    Insurance: Payor: [employer] / Plan: [employer] / Product Type: *No Product type* /   Visit #: 12  Date of Onset: DOI 2/20/24   -authorization required: yes  -number of visits authorized 12+6 or 12 (Ambika checking on this)  -authorization/ certification dates: 2/21/24-4/11/24  end date extension to 4/26/24   4/10/24-5/31/24    General:  Next MD appt: Dr Rodrigo Godinez  4/16/24    Problem List Items Addressed This Visit             ICD-10-CM    Strain of muscle, fascia and tendon of lower back, initial encounter S39.012A    Relevant Orders    Follow Up In Physical Therapy       Assessment:  skilled intervention: exercise progression for strength, reasmt    Patient would continue to benefit from skilled PT to address remaining functional, objective and subjective deficits to allow them to return to full independence with ADLs     Progressing with inc lumbar ROM/LE flexibility and core strength  Needs to inc ocre strength for safe RTW full duty    Plan:  Can have 2 more IFC treatments  1# with SLR    Precautions: no fall risk    Subjective:  General:  Feeling better and was good over the weekend  New C9 rec'd  for 6 or 12 [id number] on`  Functional Progress:  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, mostly walking around    Pain  Pain assessment 0-10  Pain score: 4  Pain location: B LS, burning R thigh    Compliant with HEP:  yes    Understanding of HEP: WNL    Objective:  Therapeutic Exercise  25 minutes  see below  **indicates new exercises or progression  NP=not performed    Neuromuscular Re-education:  15 minutes  See below  **indicates new exercises or progression  NP=not performed      Modalities   Cold Pack                         15 minutes    Electric Stimulation  IFC/CP B LS hooklying int 13    15 minutes      Other   Lumbar ROM  FB: 50%  BB: 80%  RSB: 75%  LSB: 75%     Flexibility   Hamstrings B 70  piriformis: B WNL   Strength  BLE 5/5  abdominals: 4/5   back extensors bridges 100% with mild instability    Exercise Log:  Nustep L3 8'     DKC 5x   SKC 5x  Supine hamstring stretch 10 sec 5x strap  Piriformis stretch 10 sec 5x   LTR 5x   Pelvic tilt 10x  DLS SLR 10x   Isometric hip adduction 10x  Hooklying hip abduction black 10x 2 **  Swiss ball stretches F/L 10 sec 5x       DLS flex/ext black 10x2   DLS abdominal pull downs 3 way black 10x   Paloff press black 10x2    H/V ball push outs 4# 10x 2     Knee to waist 5x  20#     Education:  Instructed in progression of exercises    Outcome Measures:  Other Measures  Oswestry Disablity Index (TYSON): 44

## 2024-04-17 ENCOUNTER — OFFICE VISIT (OUTPATIENT)
Dept: ORTHOPEDIC SURGERY | Facility: CLINIC | Age: 49
End: 2024-04-17
Payer: COMMERCIAL

## 2024-04-17 ENCOUNTER — APPOINTMENT (OUTPATIENT)
Dept: ORTHOPEDIC SURGERY | Facility: CLINIC | Age: 49
End: 2024-04-17
Payer: COMMERCIAL

## 2024-04-17 DIAGNOSIS — S39.012A LUMBAR STRAIN, INITIAL ENCOUNTER: Primary | ICD-10-CM

## 2024-04-17 PROCEDURE — 99203 OFFICE O/P NEW LOW 30 MIN: CPT | Performed by: FAMILY MEDICINE

## 2024-04-17 NOTE — PROGRESS NOTES
History of Present Illness   Chief Complaint   Patient presents with    OTHER     [facility] #   DOI 2/20/24  ALLOWED DX: S39.012A  PATIENT INJURED BACK WHILE LIFTING A TIRE.       The patient is 48 y.o. male  here with complaint of low back pain.  This is a consultation requested by occupational health after a work-related injury approximately 2 months ago.  Patient works for [employer], says he was bending down to  a tire when he felt a sharp pain across his back initially more prominent on the left but then to travel to the right.  He did see occupational health and has been for the past 2 months, treatment has been conservative, was doing physical therapy, states that he did see good improvement in symptoms, around 2 weeks ago he felt like he was getting closer to his baseline however he was out to eat and when he stood up he had some recurrent of similar pain closer to initial onset, he is continue with physical therapy and they have seen some improvement but still having discomfort across his low back.  He did go back to the ER after his recurrent exacerbation of pain and recommended outpatient follow-up.  X-rays of the back have been normal.  He says that at baseline he does have some chronic but more tolerable low back pain, says that he gets sore at the end of a long day of work but nothing to this extent, he has never seen a physician in the past for his back, with typically just treat with conservative management.  He does admit to some intermittent radiation of pain down both legs, says it is sporadic, sometimes on the left, other times on the right, on the right limited to the anterior thigh above the knee, on the left he will have symptoms further down the leg to the level of the foot at times.  Symptoms are most prominent when he is sitting for prolonged period but he also have pain with bending, standing.  He is currently on light duty work restrictions that minimizes lifting to  less than 20 pounds.  Patient denies any bowel or bladder dysfunction, no saddle anesthesia, no lower extremity numbness or weakness.    No past medical history on file.    Medication Documentation Review Audit    **Prior to Admission medications have not yet been reviewed**         No Known Allergies    Social History     Socioeconomic History    Marital status:      Spouse name: Not on file    Number of children: Not on file    Years of education: Not on file    Highest education level: Not on file   Occupational History    Not on file   Tobacco Use    Smoking status: Not on file    Smokeless tobacco: Not on file   Substance and Sexual Activity    Alcohol use: Not on file    Drug use: Not on file    Sexual activity: Not on file   Other Topics Concern    Not on file   Social History Narrative    Not on file     Social Determinants of Health     Financial Resource Strain: Not on file   Food Insecurity: Not on file   Transportation Needs: Not on file   Physical Activity: Not on file   Stress: Not on file   Social Connections: Not on file   Intimate Partner Violence: Not on file   Housing Stability: Not on file       Past Surgical History:   Procedure Laterality Date    OTHER SURGICAL HISTORY  04/10/2019    Ear surgery          Review of Systems   GENERAL: Negative  GI: Negative  MUSCULOSKELETAL: See HPI  SKIN: Negative  NEURO:  Negative     Physical Exam:    General/Constitutional: well appearing, no distress, appears stated age  HEENT: sclera clear  Respiratory: non labored breathing  Vascular: No edema, swelling or tenderness, except as noted in detailed exam.  Integumentary: No impressive skin lesions present, except as noted in detailed exam.  Neurological:  Alert and oriented   Psychological:  Normal mood and affect.  Musculoskeletal: Normal, except as noted in detailed exam and in HPI.  Normal gait, unassisted    Lumbar spine: Normal appearance.  Diffuse tenderness palpation at the lumbar spine, he has  bilateral lower lumbar paraspinal muscle tenderness, there is lower lumbar midline tenderness.  No SI joint tenderness bilaterally.  Limited range of motion is most notable with forward flexion to around 60 degrees with pain, extension just past neutral, has good mobility with bilateral twisting and sidebending but with pain.  Straight leg raise test and slump test bilaterally cause some tightness in the low back but no radicular symptoms.  2+ reflexes at bilateral patella tendon, negative clonus bilaterally. Sensation intact to light touch throughout bilateral lower extremity. No lower extremity motor deficits.  Good range of motion of bilateral hips, negative Silas bilaterally.     Imaging: X-rays of lumbar spine from 2/21/2024 as well as from 4/4/2024 reviewed and unremarkable for any acute abnormality or degenerative change      Assessment   1. Lumbar strain, initial encounter          Ongoing low back pain with intermittent radicular symptoms following work-related injury bending down to  a tire, x-rays unremarkable, there is some concern for possible disc herniation contributing to his symptoms.      Plan: I do think patient would benefit from MRI given ongoing symptoms for the past 2 months despite conservative management including formal physical therapy for the past 2 months.  We will submit a C9 request for this.  I explained the patient that I do not do any spine intervention and that he would likely benefit from seeing pain management/medical spine for further treatment moving forward, this will also be relayed to occupational health provider.  He will continue with the same work restrictions.  We will reach out with MRI results but plan for him to follow-up with occupational health and pain management moving forward.

## 2024-04-18 ENCOUNTER — APPOINTMENT (OUTPATIENT)
Dept: PHYSICAL THERAPY | Facility: CLINIC | Age: 49
End: 2024-04-18
Payer: COMMERCIAL

## 2024-04-19 ENCOUNTER — TELEPHONE (OUTPATIENT)
Dept: ORTHOPEDIC SURGERY | Facility: CLINIC | Age: 49
End: 2024-04-19
Payer: COMMERCIAL

## 2024-04-19 DIAGNOSIS — S39.012A LUMBOSACRAL STRAIN, INITIAL ENCOUNTER: ICD-10-CM

## 2024-04-28 ENCOUNTER — HOSPITAL ENCOUNTER (OUTPATIENT)
Dept: RADIOLOGY | Facility: HOSPITAL | Age: 49
Discharge: HOME | End: 2024-04-28
Payer: COMMERCIAL

## 2024-04-28 DIAGNOSIS — S39.012A LUMBOSACRAL STRAIN, INITIAL ENCOUNTER: ICD-10-CM

## 2024-04-28 PROCEDURE — 72148 MRI LUMBAR SPINE W/O DYE: CPT

## 2024-04-28 PROCEDURE — 72148 MRI LUMBAR SPINE W/O DYE: CPT | Performed by: RADIOLOGY

## 2024-04-30 ENCOUNTER — TREATMENT (OUTPATIENT)
Dept: PHYSICAL THERAPY | Facility: CLINIC | Age: 49
End: 2024-04-30
Payer: COMMERCIAL

## 2024-04-30 DIAGNOSIS — S39.012A STRAIN OF MUSCLE, FASCIA AND TENDON OF LOWER BACK, INITIAL ENCOUNTER: Primary | ICD-10-CM

## 2024-04-30 PROCEDURE — 97112 NEUROMUSCULAR REEDUCATION: CPT | Mod: GP,CQ

## 2024-04-30 PROCEDURE — 97110 THERAPEUTIC EXERCISES: CPT | Mod: GP,CQ

## 2024-04-30 PROCEDURE — 97014 ELECTRIC STIMULATION THERAPY: CPT | Mod: GP,CQ

## 2024-04-30 NOTE — PROGRESS NOTES
Physical Therapy Treatment Note      Patient Name Carlo Cho  MRN: [id number]  Today's Date: 04/30/24  Time Calculation  Start Time: 0820  Stop Time: 0915  Time Calculation (min): 55 min    Insurance: Payor: [employer] / Plan: [employer] / Product Type: *No Product type* /   Visit #: 13  Date of Onset: DOI 2/20/24   -authorization required: yes  -number of visits authorized 12+6 or 12 (Ambika checking on this)  -authorization/ certification dates: 2/21/24-4/11/24  end date extension to 4/26/24   4/10/24-5/31/24    General:  Next MD appt: Dr Rodrigo Godinez  4/16/24    Problem List Items Addressed This Visit             ICD-10-CM    Strain of muscle, fascia and tendon of lower back, initial encounter - Primary S39.012A       Assessment:  skilled intervention: exercise progression for strength    Patient would continue to benefit from skilled PT to address remaining functional, objective and subjective deficits to allow them to return to full independence with ADLs     Needs to inc core strength for safe RTW full duty    Plan:  Can have 1 more IFC treatments  SL hip circles      Precautions: no fall risk    Subjective:  General:  Good days and bad days  New C9 rec'd  for 6 or 12 [id number]-[id number] on`, 6 scheduled  Had MRI seeing Ihsan Thursday and getting a referral to a spine MD  Functional Progress:  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, mostly walking around, doing light duty    Pain  Pain assessment 0-10  Pain score: 6  Pain location: B LS, burning R thigh    Compliant with HEP:  yes    Understanding of HEP: WNL    Objective:  Therapeutic Exercise  25 minutes  see below  **indicates new exercises or progression  NP=not performed    Neuromuscular Re-education:  15 minutes  See below  **indicates new exercises or progression  NP=not performed      Modalities   Cold Pack                          minutes    Electric  Stimulation  IFC/CP B LS hooklying int 13    15 minutes      Other        Exercise Log:  Nustep L3 8'     DKC 5x   SKC 5x  Supine hamstring stretch 10 sec 5x strap  Piriformis stretch 10 sec 5x   LTR 5x   Pelvic tilt 10x  DLS SLR 10x 1# **  Isometric hip adduction 10x  Hooklying hip abduction black 10x 2   Swiss ball stretches F/L 10 sec 5x       DLS flex/ext black 10x2   DLS abdominal pull downs 3 way black 10x   Paloff press black 10x2    H/V ball push outs 4# 10x 2     Knee to waist 5x  20# NP as pt. Feels comfortable with lifting 20# ( his current max ) correctly    Education:  Instructed in progression of exercises    Outcome Measures:

## 2024-05-02 ENCOUNTER — TREATMENT (OUTPATIENT)
Dept: PHYSICAL THERAPY | Facility: CLINIC | Age: 49
End: 2024-05-02
Payer: COMMERCIAL

## 2024-05-02 DIAGNOSIS — S39.012A STRAIN OF MUSCLE, FASCIA AND TENDON OF LOWER BACK, INITIAL ENCOUNTER: ICD-10-CM

## 2024-05-02 PROCEDURE — 97140 MANUAL THERAPY 1/> REGIONS: CPT | Mod: GP

## 2024-05-02 PROCEDURE — 97110 THERAPEUTIC EXERCISES: CPT | Mod: GP

## 2024-05-02 NOTE — PROGRESS NOTES
"                                                                Physical Therapy Treatment Note      Patient Name Carlo Cho  MRN: [id number]  Today's Date: 05/02/24  Time Calculation  Start Time: 0740  Stop Time: 0830  Time Calculation (min): 50 min    Insurance: Payor: [employer] / Plan: [employer] / Product Type: *No Product type* /   Visit #: 14   Date of Onset: DOI 2/20/24   -authorization required: yes  -number of visits authorized 12+6   -authorization/ certification dates: 2/21/24-4/11/24  end date extension to 4/26/24   4/10/24-5/31/24    General:  Next MD appt: 5/2/24    Problem List Items Addressed This Visit             ICD-10-CM    Strain of muscle, fascia and tendon of lower back, initial encounter S39.012A       Assessment:  skilled intervention:   Reasmt, manual for soft tissue restrictions    Patient would continue to benefit from skilled PT to address remaining functional, objective and subjective deficits to allow them to return to full independence with ADLs    Pt cont to have inconsistent relief with PT intervention and no consistent improvement on reasmt   Pain in BLE's is diffuse with no indication of specific nerve root involvement and no reports of sensation changes or weakness in LE  Trial of STM to R LS area-will check response    Plan:  Sees [facility], suggested aquatics to patient which he will discuss with Ihsan  If return here check response to manual and cont    Precautions: no fall risk    Subjective:  General:  Pain greatest in R lateral lumbar area today  Waiting on pain management approval  Cont to report that prone is not a comfortable position  No longer getting relief from IFC    Functional Progress:  Had an episode at work yesterday with \"spasm\" while repairing a tire that lasted a few minutes  Also c/o BLE soreness-no specific location    Pain  Pain assessment 0-10  Pain score: 5  Pain location: B LS    Compliant with HEP:  " yes    Understanding of HEP: WNL    Objective:  Therapeutic Exercise  25 minutes  see below  **indicates new exercises or progression  NP=not performed    Neuromuscular Re-education:  5 minutes  See below  **indicates new exercises or progression  NP=not performed    Manual Therapy:    10 minutes    Modalities   Cold Pack  Lumbar supine                         10 minutes    Electric Stimulation       minutes      Other   Lumbar ROM  FB: 40% R LS pain  BB: 80% B LS pain  RSB: 100%  LSB: 100%     Flexibility   Hamstrings B 80  piriformis: B WNL     Strength  BLE 5/5  abdominals: 5-/5   back extensors bridges 100%     Exercise Log:  Nustep L3 8'     DKC 5x   SKC 5x  Supine hamstring stretch 10 sec 5x strap  Piriformis stretch 10 sec 5x   LTR 5x   Pelvic tilt 10x  DLS SLR 10x 1#   Isometric hip adduction 10x  Hooklying hip abduction black 10x 2   Swiss ball stretches F/L 10 sec 5x  NP     NP below  DLS flex/ext black 10x2   DLS abdominal pull downs 3 way black 10x   Paloff press black 10x2    H/V ball push outs 4# 10x 2     Knee to waist 5x  20# NP as pt. Feels comfortable with lifting 20# ( his current max ) correctly    Education:  Instructed in progression of exercises  Benefits of aquatics

## 2024-05-08 ENCOUNTER — TELEPHONE (OUTPATIENT)
Dept: PAIN MEDICINE | Facility: CLINIC | Age: 49
End: 2024-05-08
Payer: COMMERCIAL

## 2024-05-08 NOTE — TELEPHONE ENCOUNTER
PATIENT SPOKE WITH PAIN MANAGMENT YESTERDAY WANTING TO SCHEDULE A CONSULT WITH PAIN MGT. C-9 SUBMITTED BY DR. LOZANO. C-9 DENIED. STATES REQUEST FOR PAIN MGT IS NOT REASONABLY RELATED OR NECESSARY FOR THE ALLOWED CONDITIONS IN THE CLAIM.   PATIENT THOUGHT THIS C-9 WAS APPROVED FOR THE PAIN DEPT.  REACHED OUT TODAY TO ADVISE PATIENT OF THE DENIAL.  M FOR PATIENT TO CALL ME BACK      PATIENT RETURNED CALL.  WILL REACH OUT TO DR. LOZANO'S OFFICE TO FOLLOW UP ON THE C-9 DENIAL.

## 2024-05-08 NOTE — TELEPHONE ENCOUNTER
Result Communication    No results found from the In Basket message.    11:57 AM      Results {WERE / WERE NOT:79809} successfully communicated with the {RHEUM PARENT/PATIENT:[id number]} and they {AMB Acknowledged/Did Not Acknowledge:40470} their understanding.

## 2024-05-09 ENCOUNTER — APPOINTMENT (OUTPATIENT)
Dept: PHYSICAL THERAPY | Facility: CLINIC | Age: 49
End: 2024-05-09
Payer: COMMERCIAL

## 2024-05-13 ENCOUNTER — APPOINTMENT (OUTPATIENT)
Dept: PHYSICAL THERAPY | Facility: CLINIC | Age: 49
End: 2024-05-13
Payer: COMMERCIAL

## 2024-05-15 ENCOUNTER — APPOINTMENT (OUTPATIENT)
Dept: PHYSICAL THERAPY | Facility: CLINIC | Age: 49
End: 2024-05-15
Payer: COMMERCIAL

## 2024-05-16 ENCOUNTER — APPOINTMENT (OUTPATIENT)
Dept: PHYSICAL THERAPY | Facility: CLINIC | Age: 49
End: 2024-05-16
Payer: COMMERCIAL

## 2024-05-21 ENCOUNTER — APPOINTMENT (OUTPATIENT)
Dept: PHYSICAL THERAPY | Facility: CLINIC | Age: 49
End: 2024-05-21
Payer: COMMERCIAL

## 2024-05-29 ENCOUNTER — APPOINTMENT (OUTPATIENT)
Dept: PHYSICAL THERAPY | Facility: CLINIC | Age: 49
End: 2024-05-29
Payer: COMMERCIAL

## 2024-05-31 ENCOUNTER — APPOINTMENT (OUTPATIENT)
Dept: PHYSICAL THERAPY | Facility: CLINIC | Age: 49
End: 2024-05-31
Payer: COMMERCIAL

## 2024-09-28 ENCOUNTER — HOSPITAL ENCOUNTER (EMERGENCY)
Facility: HOSPITAL | Age: 49
Discharge: HOME | End: 2024-09-28
Payer: COMMERCIAL

## 2024-09-28 VITALS
RESPIRATION RATE: 18 BRPM | SYSTOLIC BLOOD PRESSURE: 127 MMHG | WEIGHT: 120 LBS | OXYGEN SATURATION: 98 % | TEMPERATURE: 98.2 F | HEART RATE: 87 BPM | DIASTOLIC BLOOD PRESSURE: 83 MMHG | BODY MASS INDEX: 18.83 KG/M2 | HEIGHT: 67 IN

## 2024-09-28 DIAGNOSIS — T15.91XA FOREIGN BODY OF RIGHT EYE, INITIAL ENCOUNTER: Primary | ICD-10-CM

## 2024-09-28 DIAGNOSIS — S05.01XA ABRASION OF RIGHT CORNEA, INITIAL ENCOUNTER: ICD-10-CM

## 2024-09-28 PROCEDURE — 99283 EMERGENCY DEPT VISIT LOW MDM: CPT

## 2024-09-28 PROCEDURE — 2500000001 HC RX 250 WO HCPCS SELF ADMINISTERED DRUGS (ALT 637 FOR MEDICARE OP): Performed by: PHYSICIAN ASSISTANT

## 2024-09-28 RX ORDER — TETRACAINE HYDROCHLORIDE 5 MG/ML
1 SOLUTION OPHTHALMIC ONCE
Status: COMPLETED | OUTPATIENT
Start: 2024-09-28 | End: 2024-09-28

## 2024-09-28 RX ORDER — ERYTHROMYCIN 5 MG/G
OINTMENT OPHTHALMIC 2 TIMES DAILY
Qty: 3.5 G | Refills: 0 | Status: SHIPPED | OUTPATIENT
Start: 2024-09-28 | End: 2024-10-08

## 2024-09-28 RX ADMIN — TETRACAINE HYDROCHLORIDE 1 DROP: 5 SOLUTION OPHTHALMIC at 19:32

## 2024-09-28 RX ADMIN — FLUORESCEIN SODIUM 1 STRIP: 1 STRIP OPHTHALMIC at 19:31

## 2024-09-28 ASSESSMENT — VISUAL ACUITY
OD: 20
OS: 20
OU: 30
OU: 20
OS: 20
OD: 20

## 2024-09-28 NOTE — ED PROVIDER NOTES
HPI   Chief Complaint   Patient presents with    Foreign Body in Eye       49-year-old male presents complaining of a foreign body to the right eye.  Patient states that he was working on his car when he felt something make contact with his eye.  He denies any use of contact lenses.  Denies any use of welding equipment.  He reports continuous irritation increased lacrimation and redness.              Patient History   No past medical history on file.  Past Surgical History:   Procedure Laterality Date    OTHER SURGICAL HISTORY  04/10/2019    Ear surgery     No family history on file.  Social History     Tobacco Use    Smoking status: Not on file    Smokeless tobacco: Not on file   Substance Use Topics    Alcohol use: Not on file    Drug use: Not on file       Physical Exam   ED Triage Vitals [09/28/24 1908]   Temperature Heart Rate Respirations BP   36.8 °C (98.2 °F) 87 18 127/83      Pulse Ox Temp Source Heart Rate Source Patient Position   98 % Temporal -- Sitting      BP Location FiO2 (%)     Left arm --       Physical Exam  Vitals and nursing note reviewed.   Constitutional:       General: He is not in acute distress.     Appearance: Normal appearance. He is normal weight. He is not ill-appearing, toxic-appearing or diaphoretic.   Eyes:      Extraocular Movements: Extraocular movements intact.   Cardiovascular:      Rate and Rhythm: Normal rate and regular rhythm.      Pulses: Normal pulses.   Pulmonary:      Effort: Pulmonary effort is normal. No respiratory distress.      Breath sounds: Normal breath sounds.   Abdominal:      Tenderness: There is no guarding.   Skin:     General: Skin is warm and dry.      Capillary Refill: Capillary refill takes less than 2 seconds.   Neurological:      General: No focal deficit present.      Mental Status: He is alert and oriented to person, place, and time.   Psychiatric:         Mood and Affect: Mood normal.         Behavior: Behavior normal.         Thought Content:  Thought content normal.         Judgment: Judgment normal.           ED Course & MDM   Diagnoses as of 09/28/24 1940   Foreign body of right eye, initial encounter   Abrasion of right cornea, initial encounter                 No data recorded     Colbert Coma Scale Score: 15 (09/28/24 1909 : Nayan Aleman, FRANCES)                           Medical Decision Making  Patient was found to have a foreign object at the 6 o'clock position of the right eye.  This was removed with a cotton tip swab.  No additional foreign bodies were appreciated on exam.  His lid was inverted.  Fluorescein stain revealed no evidence of Bi sign.  There was a small amount of uptake likely representing a corneal abrasion.  This was around the 5 to 6 o'clock position.  Patient will be discharged home with instructions to follow-up with the assigned ophthalmology clinic.  He was given a prescription for erythromycin ophthalmic ointment to go home with.        Procedure  Procedures     Eze Menendez PA-C  09/28/24 1944

## 2024-09-28 NOTE — ED TRIAGE NOTES
Alert and oriented times three, was taking bolt off car and dislodged rust getting rust into the right eye. Endorses scratchy feeling. Did use eye wash at work. Visual acuity performed. No focal weakness. Slight redness ro right eye. No drainage. Denies left eye involvement.

## 2024-09-30 ENCOUNTER — TELEPHONE (OUTPATIENT)
Dept: OPHTHALMOLOGY | Facility: CLINIC | Age: 49
End: 2024-09-30
Payer: COMMERCIAL

## 2024-10-24 ENCOUNTER — APPOINTMENT (OUTPATIENT)
Dept: OPHTHALMOLOGY | Facility: CLINIC | Age: 49
End: 2024-10-24
Payer: COMMERCIAL

## 2024-10-24 DIAGNOSIS — S05.01XA ABRASION OF RIGHT CORNEA, INITIAL ENCOUNTER: ICD-10-CM

## 2024-10-24 ASSESSMENT — SLIT LAMP EXAM - LIDS
COMMENTS: NORMAL
COMMENTS: NORMAL

## 2024-10-24 ASSESSMENT — CONF VISUAL FIELD
OD_SUPERIOR_TEMPORAL_RESTRICTION: 0
OS_SUPERIOR_TEMPORAL_RESTRICTION: 0
OS_SUPERIOR_NASAL_RESTRICTION: 0
OS_INFERIOR_TEMPORAL_RESTRICTION: 0
OD_SUPERIOR_NASAL_RESTRICTION: 0
OS_INFERIOR_NASAL_RESTRICTION: 0
OD_INFERIOR_NASAL_RESTRICTION: 0
OD_NORMAL: 1
OD_INFERIOR_TEMPORAL_RESTRICTION: 0
OS_NORMAL: 1

## 2024-10-24 ASSESSMENT — EXTERNAL EXAM - RIGHT EYE: OD_EXAM: NORMAL

## 2024-10-24 ASSESSMENT — EXTERNAL EXAM - LEFT EYE: OS_EXAM: NORMAL

## 2024-10-24 ASSESSMENT — ENCOUNTER SYMPTOMS: EYES NEGATIVE: 1

## 2024-10-24 ASSESSMENT — VISUAL ACUITY
METHOD: SNELLEN - LINEAR
OD_SC: 20/20
OS_SC: 20/20

## 2024-10-24 NOTE — PROGRESS NOTES
Assessment/Plan   Diagnoses and all orders for this visit:  Abrasion of right cornea, initial encounter  -     Referral to Ophthalmology    Had a corneal abrasion a month ago which was treated when he presented to the ER at that time    Normal eye exam today    See PRN